# Patient Record
Sex: MALE | Race: WHITE | NOT HISPANIC OR LATINO
[De-identification: names, ages, dates, MRNs, and addresses within clinical notes are randomized per-mention and may not be internally consistent; named-entity substitution may affect disease eponyms.]

---

## 2019-05-31 ENCOUNTER — TRANSCRIPTION ENCOUNTER (OUTPATIENT)
Age: 39
End: 2019-05-31

## 2024-06-05 ENCOUNTER — APPOINTMENT (OUTPATIENT)
Dept: ORTHOPEDIC SURGERY | Facility: CLINIC | Age: 44
End: 2024-06-05
Payer: OTHER MISCELLANEOUS

## 2024-06-05 VITALS — HEIGHT: 74 IN | WEIGHT: 260 LBS | BODY MASS INDEX: 33.37 KG/M2

## 2024-06-05 DIAGNOSIS — M23.91 UNSPECIFIED INTERNAL DERANGEMENT OF RIGHT KNEE: ICD-10-CM

## 2024-06-05 PROCEDURE — 73564 X-RAY EXAM KNEE 4 OR MORE: CPT | Mod: RT

## 2024-06-05 PROCEDURE — 73552 X-RAY EXAM OF FEMUR 2/>: CPT | Mod: RT

## 2024-06-05 PROCEDURE — 99203 OFFICE O/P NEW LOW 30 MIN: CPT

## 2024-06-05 NOTE — PHYSICAL EXAM
[Right] : right knee [NL (0)] : extension 0 degrees [5___] : hamstring 5[unfilled]/5 [] : no erythema [TWNoteComboBox7] : flexion 110 degrees

## 2024-06-05 NOTE — HISTORY OF PRESENT ILLNESS
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 course superintendent   06/05/2024: 45 y/o male c/o right knee pain following an injury at work on 5/28/24. He was jumping out of a utility cart that was tipping over and twisted the knee. Describes anterior knee pain and swelling. Notes limited ROM and pain with bearing full weight. Occasional buckling. He has been using ice and taking Advil with temporary relief. Denies history of prior injury. He has continued to work. 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

## 2024-06-05 NOTE — DISCUSSION/SUMMARY
[de-identified] : The patient was advised of the diagnosis. The natural history of the pathology was explained in full to the patient in layman's terms. The risks and benefits of surgical and non-surgical treatment alternatives were explained in full to the patient. All questions were answered.

## 2024-06-05 NOTE — ASSESSMENT
[FreeTextEntry1] : Underlying pathology reviewed and treatment options discussed. 06/05/2024: RT knee x-rays, 4 views, reveal OA. RT femur x-rays, 2 views, reveal fracture that has healed mid shaft of his femur  Obtain MRI of his femur for further eval.  F/up after MRI.  The documentation recorded by the scribe accurately reflects the service I personally performed and the decisions made by me. I, Rogerio Isaac, attest that this documentation has been prepared under the direction and in the presence of Provider Mynor Messer MD.  The patient was seen by Mynor Messer MD.

## 2024-06-14 ENCOUNTER — APPOINTMENT (OUTPATIENT)
Dept: MRI IMAGING | Facility: CLINIC | Age: 44
End: 2024-06-14
Payer: OTHER MISCELLANEOUS

## 2024-06-14 PROCEDURE — 73721 MRI JNT OF LWR EXTRE W/O DYE: CPT | Mod: RT

## 2024-06-19 ENCOUNTER — APPOINTMENT (OUTPATIENT)
Dept: ORTHOPEDIC SURGERY | Facility: CLINIC | Age: 44
End: 2024-06-19
Payer: OTHER MISCELLANEOUS

## 2024-06-19 DIAGNOSIS — M17.11 UNILATERAL PRIMARY OSTEOARTHRITIS, RIGHT KNEE: ICD-10-CM

## 2024-06-19 DIAGNOSIS — S83.241D OTHER TEAR OF MEDIAL MENISCUS, CURRENT INJURY, RIGHT KNEE, SUBSEQUENT ENCOUNTER: ICD-10-CM

## 2024-06-19 PROCEDURE — 99214 OFFICE O/P EST MOD 30 MIN: CPT

## 2024-06-19 RX ORDER — METHYLPREDNISOLONE 4 MG/1
4 TABLET ORAL
Qty: 1 | Refills: 0 | Status: COMPLETED | COMMUNITY
Start: 2024-06-19 | End: 2024-06-25

## 2024-06-19 NOTE — PHYSICAL EXAM
[Right] : right knee [NL (0)] : extension 0 degrees [5___] : hamstring 5[unfilled]/5 [] : patellofemoral tenderness [TWNoteComboBox7] : flexion 110 degrees

## 2024-06-19 NOTE — HISTORY OF PRESENT ILLNESS
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   06/05/2024: 45 y/o male c/o right knee pain following an injury at work on 5/28/24. He was jumping out of a utility cart that was tipping over and twisted the knee. Describes anterior knee pain and swelling. Notes limited ROM and pain with bearing full weight. Occasional buckling. He has been using ice and taking Advil with temporary relief. Denies history of prior injury. He has continued to work. 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

## 2024-06-19 NOTE — DATA REVIEWED
[MRI] : MRI [Right] : of the right [Knee] : knee [Report was reviewed and noted in the chart] : The report was reviewed and noted in the chart [I independently reviewed and interpreted images and report] : I independently reviewed and interpreted images and report [FreeTextEntry1] : [facility] MRI RIGHT KNEE: 06/14/2024 IMPRESSION: Posterior medial meniscal root tearing with surrounding synovitis and mild subchondral edema in the posterior medial tibial pleteau. 2. Medail compartment arthosis, 3. MCL sprain. 4. Moderate effusion and thick medial synovial plica with mild pes anserine tendinitis and bursitis. 5. Lateral meniscal degeneration and fraying without tear. 6. No acute osseous injury. 7. Extensor mechanism tendinopathy without tear.  Electronically signed by Kaiden Arellano MD. I independently reviewed the images, and the patient was seen by sweta Messer MD.

## 2024-06-19 NOTE — DISCUSSION/SUMMARY
[de-identified] : The patient was advised of the diagnosis. The natural history of the pathology was explained in full to the patient in layman's terms. The risks and benefits of surgical and non-surgical treatment alternatives were explained in full to the patient. All questions were answered.

## 2024-08-28 ENCOUNTER — APPOINTMENT (OUTPATIENT)
Dept: ORTHOPEDIC SURGERY | Facility: CLINIC | Age: 44
End: 2024-08-28
Payer: OTHER MISCELLANEOUS

## 2024-08-28 DIAGNOSIS — M17.11 UNILATERAL PRIMARY OSTEOARTHRITIS, RIGHT KNEE: ICD-10-CM

## 2024-08-28 DIAGNOSIS — S83.241D OTHER TEAR OF MEDIAL MENISCUS, CURRENT INJURY, RIGHT KNEE, SUBSEQUENT ENCOUNTER: ICD-10-CM

## 2024-08-28 PROCEDURE — 99214 OFFICE O/P EST MOD 30 MIN: CPT | Mod: 25

## 2024-08-28 PROCEDURE — 20610 DRAIN/INJ JOINT/BURSA W/O US: CPT | Mod: RT

## 2024-08-28 NOTE — ASSESSMENT
[FreeTextEntry1] : Underlying pathology reviewed and treatment options discussed. 06/05/2024: RT knee x-rays, 4 views, reveal OA. RT femur x-rays, 2 views, reveal fracture that has healed mid shaft of his femur  Obtain MRI of his femur for further eval.  F/up after MRI.  06/19/2024: MRI reviewed and discussed, he has increased inflammation. Most of his pain is in the medial tibial plateau.  Prescribed MDP. Start PT and HEP to improve mechanics and reduce pain.  08/28/2024: We discussed his options. We discussed risk and benefits of CSI. Patient elected to proceed with steroid injection today - tolerated well. Ice if sore.  The documentation recorded by the scribe accurately reflects the service I personally performed and the decisions made by me. I, Rogerio Isaac, attest that this documentation has been prepared under the direction and in the presence of Provider Mynor Messer MD.  The patient was seen by Mynor Messer MD.

## 2024-08-28 NOTE — PROCEDURE
[FreeTextEntry3] :  A Large joint injection was performed of the [RIGHT KNEE]. The indication for this procedure was pain and inflammation, and patient had decreased mobility in the joint. Risks, benefits and alternatives to a steroid injection procedure were discussed; Risks outlined include but are not limited to infection, sepsis, bleeding, scarring, skin discoloration, temporary increase in pain, syncopal episode, failure to resolve symptoms, allergic reaction, symptom recurrence, and elevation of blood sugar in diabetics.. All questions were answered to the patient's apparent satisfaction and informed consent obtained. Prior to injection a 'Time Out' was conducted in accordance with [facility] policy and the site and nature of procedure verified with the patient.    Procedure: The area of injection was prepared in a sterile fashion. The injection and aspiration was carried out utilizing sterile technique. The site was prepped with alcohol, betadine, ethyl chloride sprayed topically and sterile technique used.   ( X ) 1cc of Celestone(30mg/ml)  ( X ) 2cc of 1% Lidocaine   Patient tolerated the procedure well and direct pressure was applied for hemostasis. The patient was reminded of potential post-injection risks including, but not limited to, delayed hypersensitivity reactions and/or infection. Ice tonight to the injection site.

## 2024-08-28 NOTE — HISTORY OF PRESENT ILLNESS
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 Sharp pain w/twisting. Denies relief with MDP. Was going to PT and now doing HEP.   06/19/2024: Here with MRI results of his right knee.   06/05/2024: 43 y/o male c/o right knee pain following an injury at work on 5/28/24. He was jumping out of a utility cart that was tipping over and twisted the knee. Describes anterior knee pain and swelling. Notes limited ROM and pain with bearing full weight. Occasional buckling. He has been using ice and taking Advil with temporary relief. Denies history of prior injury. He has continued to work. 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

## 2024-09-18 ENCOUNTER — APPOINTMENT (OUTPATIENT)
Dept: ORTHOPEDIC SURGERY | Facility: CLINIC | Age: 44
End: 2024-09-18
Payer: OTHER MISCELLANEOUS

## 2024-09-18 VITALS — HEIGHT: 74 IN | BODY MASS INDEX: 33.37 KG/M2 | WEIGHT: 260 LBS

## 2024-09-18 DIAGNOSIS — M17.11 UNILATERAL PRIMARY OSTEOARTHRITIS, RIGHT KNEE: ICD-10-CM

## 2024-09-18 DIAGNOSIS — S83.241D OTHER TEAR OF MEDIAL MENISCUS, CURRENT INJURY, RIGHT KNEE, SUBSEQUENT ENCOUNTER: ICD-10-CM

## 2024-09-18 PROCEDURE — 99214 OFFICE O/P EST MOD 30 MIN: CPT | Mod: 57

## 2024-09-18 NOTE — HISTORY OF PRESENT ILLNESS
[Work related] : work related [Gradual] : gradual [Sudden] : sudden [Result of repetitive motion] : result of repetitive motion [4] : 4 [Sharp] : sharp [Constant] : constant [Household chores] : household chores [Work] : work [Social interactions] : social interactions [Nothing helps with pain getting better] : Nothing helps with pain getting better [Walking] : walking [Exercising] : exercising [Stairs] : stairs [Full time] : Work status: full time [de-identified] : WC 5/28/24,    09/18/2024: Follow up right knee. Denies relief with CSI. States he continues to have pain with running.  08/28/2024: Right knee pain has gotten worse since last visit. Sharp pain w/twisting. Denies relief with MDP. Was going to PT and now doing HEP.   06/19/2024: Here with MRI results of his right knee.   06/05/2024: 43 y/o male c/o right knee pain following an injury at work on 5/28/24. He was jumping out of a utility cart that was tipping over and twisted the knee. Describes anterior knee pain and swelling. Notes limited ROM and pain with bearing full weight. Occasional buckling. He has been using ice and taking Advil with temporary relief. Denies history of prior injury. He has continued to work. 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

## 2024-09-18 NOTE — ASSESSMENT
[FreeTextEntry1] : Underlying pathology reviewed and treatment options discussed. 06/05/2024: RT knee x-rays, 4 views, reveal OA. RT femur x-rays, 2 views, reveal fracture that has healed mid shaft of his femur  Obtain MRI of his femur for further eval.  F/up after MRI.  06/19/2024: MRI reviewed and discussed, he has increased inflammation. Most of his pain is in the medial tibial plateau.  Prescribed MDP. Start PT and HEP to improve mechanics and reduce pain.  08/28/2024: We discussed his options. We discussed risk and benefits of CSI. Patient elected to proceed with steroid injection today - tolerated well. Ice if sore.  09/18/2024: Temporary disappointing relief with CSI, no relief with MDP Underlying pathology reviewed and treatment options discussed. Activity modification as tolerated. Consider meniscus repair surgery due to no relief with past injections or PT. The r/b/a to surgery were discussed and the patient elects to proceed. We discussed post-op protocol and when to return to activity. Questions addressed.   The patient has two pathologic conditions at this point.  There is a meniscus tear which is causing symptomology and there is also underlying osteoarthritis.  The patient was counseled that surgical intervention to address the torn meniscus will not change the symptomology attributable to the arthritis.  The patient was advised that the pain attributable to the meniscus tear should be resolved arthroscopically.  However, the patient should expect to have continued aches and pains related to the arthritis, in the form of, but not limited to pain, weather related changes, dull ache, crepitation, limitation of motion and strength and the need for further surgeries. The patient understands that the arthroscopic procedure addresses the meniscus only and that after surgery, the knee will not be 100% but it should be improved with respect to the symptomology attributed to the torn meniscus.  Patient also is aware that further treatment after arthroscopy may be necessary in the form of oral medications, cortisone and/or viscosupplementation injections, and further surgeries including knee replacement.  The patient agrees, understands and wishes to proceed.  The documentation recorded by the scribe accurately reflects the service I personally performed and the decisions made by me. I, Rogerio Vargas, attest that this documentation has been prepared under the direction and in the presence of Provider Mynor Messer MD.   The patient was seen by Mynor Messer MD.

## 2024-11-11 RX ORDER — HYDROCODONE BITARTRATE AND ACETAMINOPHEN 5; 325 MG/1; MG/1
5-325 TABLET ORAL
Qty: 25 | Refills: 0 | Status: ACTIVE | COMMUNITY
Start: 2024-11-11 | End: 1900-01-01

## 2024-11-11 RX ORDER — CEPHALEXIN 500 MG/1
500 TABLET ORAL 4 TIMES DAILY
Qty: 4 | Refills: 0 | Status: ACTIVE | COMMUNITY
Start: 2024-11-11 | End: 1900-01-01

## 2024-11-12 ENCOUNTER — APPOINTMENT (OUTPATIENT)
Age: 44
End: 2024-11-12
Payer: OTHER MISCELLANEOUS

## 2024-11-12 PROCEDURE — 29875 ARTHRS KNEE SURG SYNVCT LMTD: CPT | Mod: 59,RT

## 2024-11-12 PROCEDURE — 29881 ARTHRS KNE SRG MNISECTMY M/L: CPT | Mod: RT

## 2024-11-20 ENCOUNTER — APPOINTMENT (OUTPATIENT)
Dept: ORTHOPEDIC SURGERY | Facility: CLINIC | Age: 44
End: 2024-11-20
Payer: OTHER MISCELLANEOUS

## 2024-11-20 ENCOUNTER — NON-APPOINTMENT (OUTPATIENT)
Age: 44
End: 2024-11-20

## 2024-11-20 VITALS — HEIGHT: 74 IN | WEIGHT: 260 LBS | BODY MASS INDEX: 33.37 KG/M2

## 2024-11-20 DIAGNOSIS — S83.241D OTHER TEAR OF MEDIAL MENISCUS, CURRENT INJURY, RIGHT KNEE, SUBSEQUENT ENCOUNTER: ICD-10-CM

## 2024-11-20 DIAGNOSIS — Z98.890 OTHER SPECIFIED POSTPROCEDURAL STATES: ICD-10-CM

## 2024-11-20 PROCEDURE — 99024 POSTOP FOLLOW-UP VISIT: CPT

## 2024-11-20 RX ORDER — CEPHALEXIN 500 MG/1
500 TABLET ORAL 4 TIMES DAILY
Qty: 40 | Refills: 0 | Status: COMPLETED | COMMUNITY
Start: 2024-11-20 | End: 2024-11-30

## 2024-12-18 ENCOUNTER — APPOINTMENT (OUTPATIENT)
Dept: ORTHOPEDIC SURGERY | Facility: CLINIC | Age: 44
End: 2024-12-18
Payer: OTHER MISCELLANEOUS

## 2024-12-18 VITALS — HEIGHT: 74 IN | WEIGHT: 260 LBS | BODY MASS INDEX: 33.37 KG/M2

## 2024-12-18 DIAGNOSIS — Z98.890 OTHER SPECIFIED POSTPROCEDURAL STATES: ICD-10-CM

## 2024-12-18 PROCEDURE — 99024 POSTOP FOLLOW-UP VISIT: CPT

## 2025-09-08 ENCOUNTER — APPOINTMENT (OUTPATIENT)
Dept: CARDIOLOGY | Facility: CLINIC | Age: 45
End: 2025-09-08